# Patient Record
Sex: MALE | Race: WHITE | Employment: UNEMPLOYED | URBAN - METROPOLITAN AREA
[De-identification: names, ages, dates, MRNs, and addresses within clinical notes are randomized per-mention and may not be internally consistent; named-entity substitution may affect disease eponyms.]

---

## 2017-03-09 ENCOUNTER — OFFICE VISIT (OUTPATIENT)
Dept: SURGERY | Age: 42
End: 2017-03-09

## 2017-03-09 VITALS
HEIGHT: 72 IN | SYSTOLIC BLOOD PRESSURE: 108 MMHG | WEIGHT: 276 LBS | DIASTOLIC BLOOD PRESSURE: 60 MMHG | BODY MASS INDEX: 37.38 KG/M2 | HEART RATE: 84 BPM | RESPIRATION RATE: 17 BRPM

## 2017-03-09 DIAGNOSIS — L72.11 PILAR CYST: Primary | ICD-10-CM

## 2017-03-09 PROCEDURE — 99202 OFFICE O/P NEW SF 15 MIN: CPT | Performed by: SURGERY

## 2017-03-31 ENCOUNTER — HOSPITAL ENCOUNTER (OUTPATIENT)
Dept: OTHER | Age: 42
Discharge: HOME | End: 2017-03-31
Attending: SURGERY | Admitting: SURGERY

## 2017-03-31 ENCOUNTER — TELEPHONE (OUTPATIENT)
Dept: SURGERY | Age: 42
End: 2017-03-31

## 2017-03-31 ENCOUNTER — PROCEDURE VISIT (OUTPATIENT)
Dept: SURGERY | Age: 42
End: 2017-03-31

## 2017-03-31 VITALS
BODY MASS INDEX: 37.38 KG/M2 | SYSTOLIC BLOOD PRESSURE: 108 MMHG | RESPIRATION RATE: 16 BRPM | WEIGHT: 276 LBS | DIASTOLIC BLOOD PRESSURE: 62 MMHG | HEIGHT: 72 IN | HEART RATE: 80 BPM

## 2017-03-31 DIAGNOSIS — L72.11 PILAR CYST: Primary | ICD-10-CM

## 2017-03-31 DIAGNOSIS — L72.3 SEBACEOUS CYST: Primary | ICD-10-CM

## 2017-03-31 PROCEDURE — 11442 EXC FACE-MM B9+MARG 1.1-2 CM: CPT | Performed by: SURGERY

## 2017-04-13 ENCOUNTER — OFFICE VISIT (OUTPATIENT)
Dept: SURGERY | Age: 42
End: 2017-04-13

## 2017-04-13 VITALS
HEIGHT: 72 IN | BODY MASS INDEX: 37.38 KG/M2 | RESPIRATION RATE: 17 BRPM | SYSTOLIC BLOOD PRESSURE: 112 MMHG | DIASTOLIC BLOOD PRESSURE: 82 MMHG | WEIGHT: 276 LBS | HEART RATE: 82 BPM

## 2017-04-13 DIAGNOSIS — Z09 POSTOP CHECK: ICD-10-CM

## 2017-04-13 DIAGNOSIS — L72.3 SEBACEOUS CYST: Primary | ICD-10-CM

## 2017-04-13 PROCEDURE — 99024 POSTOP FOLLOW-UP VISIT: CPT | Performed by: SURGERY

## 2020-09-24 ENCOUNTER — HOSPITAL ENCOUNTER (OUTPATIENT)
Age: 45
Setting detail: OBSERVATION
Discharge: NURSING, REHAB OR LONG-TERM CARE | End: 2020-09-25
Attending: EMERGENCY MEDICINE | Admitting: STUDENT IN AN ORGANIZED HEALTH CARE EDUCATION/TRAINING PROGRAM
Payer: MEDICARE

## 2020-09-24 PROCEDURE — 99285 EMERGENCY DEPT VISIT HI MDM: CPT

## 2020-09-24 ASSESSMENT — PAIN DESCRIPTION - LOCATION: LOCATION: BACK

## 2020-09-24 ASSESSMENT — PAIN SCALES - GENERAL: PAINLEVEL_OUTOF10: 9

## 2020-09-25 ENCOUNTER — APPOINTMENT (OUTPATIENT)
Dept: GENERAL RADIOLOGY | Age: 45
End: 2020-09-25
Payer: MEDICARE

## 2020-09-25 VITALS
WEIGHT: 245.15 LBS | OXYGEN SATURATION: 94 % | HEART RATE: 59 BPM | RESPIRATION RATE: 16 BRPM | SYSTOLIC BLOOD PRESSURE: 109 MMHG | HEIGHT: 72 IN | TEMPERATURE: 98.1 F | DIASTOLIC BLOOD PRESSURE: 66 MMHG | BODY MASS INDEX: 33.2 KG/M2

## 2020-09-25 PROBLEM — Z71.89 ENCOUNTER FOR HOME SAFETY REVIEW FOR INJURY PREVENTION: Status: ACTIVE | Noted: 2020-09-25

## 2020-09-25 PROBLEM — I10 HTN (HYPERTENSION): Status: ACTIVE | Noted: 2020-09-25

## 2020-09-25 PROBLEM — Z86.73 HISTORY OF CVA (CEREBROVASCULAR ACCIDENT): Status: ACTIVE | Noted: 2020-09-25

## 2020-09-25 PROBLEM — R53.1 GENERALIZED WEAKNESS: Status: ACTIVE | Noted: 2020-09-25

## 2020-09-25 PROBLEM — J44.9 COPD (CHRONIC OBSTRUCTIVE PULMONARY DISEASE) (HCC): Status: ACTIVE | Noted: 2020-09-25

## 2020-09-25 PROBLEM — D72.829 LEUKOCYTOSIS: Status: ACTIVE | Noted: 2020-09-25

## 2020-09-25 PROBLEM — E78.5 HLD (HYPERLIPIDEMIA): Status: ACTIVE | Noted: 2020-09-25

## 2020-09-25 LAB
ANION GAP SERPL CALCULATED.3IONS-SCNC: 13 MMOL/L (ref 3–16)
BASOPHILS ABSOLUTE: 0.1 K/UL (ref 0–0.2)
BASOPHILS RELATIVE PERCENT: 0.7 %
BILIRUBIN URINE: NEGATIVE
BLOOD, URINE: NEGATIVE
BUN BLDV-MCNC: 11 MG/DL (ref 7–20)
CALCIUM SERPL-MCNC: 9.8 MG/DL (ref 8.3–10.6)
CHLORIDE BLD-SCNC: 105 MMOL/L (ref 99–110)
CLARITY: ABNORMAL
CO2: 23 MMOL/L (ref 21–32)
COLOR: YELLOW
CREAT SERPL-MCNC: 0.7 MG/DL (ref 0.9–1.3)
EKG ATRIAL RATE: 55 BPM
EKG DIAGNOSIS: NORMAL
EKG P AXIS: 67 DEGREES
EKG P-R INTERVAL: 152 MS
EKG Q-T INTERVAL: 434 MS
EKG QRS DURATION: 96 MS
EKG QTC CALCULATION (BAZETT): 415 MS
EKG R AXIS: 84 DEGREES
EKG T AXIS: 67 DEGREES
EKG VENTRICULAR RATE: 55 BPM
EOSINOPHILS ABSOLUTE: 0.1 K/UL (ref 0–0.6)
EOSINOPHILS RELATIVE PERCENT: 0.8 %
EPITHELIAL CELLS, UA: 1 /HPF (ref 0–5)
GFR AFRICAN AMERICAN: >60
GFR NON-AFRICAN AMERICAN: >60
GLUCOSE BLD-MCNC: 103 MG/DL (ref 70–99)
GLUCOSE URINE: NEGATIVE MG/DL
HCT VFR BLD CALC: 46.3 % (ref 40.5–52.5)
HEMOGLOBIN: 15.5 G/DL (ref 13.5–17.5)
HYALINE CASTS: 1 /LPF (ref 0–8)
INR BLD: 1.49 (ref 0.86–1.14)
KETONES, URINE: NEGATIVE MG/DL
LEUKOCYTE ESTERASE, URINE: NEGATIVE
LYMPHOCYTES ABSOLUTE: 1.5 K/UL (ref 1–5.1)
LYMPHOCYTES RELATIVE PERCENT: 9.4 %
MCH RBC QN AUTO: 26.7 PG (ref 26–34)
MCHC RBC AUTO-ENTMCNC: 33.6 G/DL (ref 31–36)
MCV RBC AUTO: 79.7 FL (ref 80–100)
MICROSCOPIC EXAMINATION: YES
MONOCYTES ABSOLUTE: 0.5 K/UL (ref 0–1.3)
MONOCYTES RELATIVE PERCENT: 3.4 %
NEUTROPHILS ABSOLUTE: 13.5 K/UL (ref 1.7–7.7)
NEUTROPHILS RELATIVE PERCENT: 85.7 %
NITRITE, URINE: NEGATIVE
PDW BLD-RTO: 16.1 % (ref 12.4–15.4)
PH UA: 7.5 (ref 5–8)
PLATELET # BLD: 282 K/UL (ref 135–450)
PMV BLD AUTO: 7.7 FL (ref 5–10.5)
POTASSIUM REFLEX MAGNESIUM: 3.7 MMOL/L (ref 3.5–5.1)
PROTEIN UA: NEGATIVE MG/DL
PROTHROMBIN TIME: 17.4 SEC (ref 10–13.2)
RBC # BLD: 5.81 M/UL (ref 4.2–5.9)
RBC UA: 1 /HPF (ref 0–4)
SODIUM BLD-SCNC: 141 MMOL/L (ref 136–145)
SPECIFIC GRAVITY UA: 1.02 (ref 1–1.03)
TOTAL CK: 142 U/L (ref 39–308)
TROPONIN: <0.01 NG/ML
URINE REFLEX TO CULTURE: ABNORMAL
URINE TYPE: ABNORMAL
UROBILINOGEN, URINE: 1 E.U./DL
WBC # BLD: 15.8 K/UL (ref 4–11)
WBC UA: 1 /HPF (ref 0–5)

## 2020-09-25 PROCEDURE — 93005 ELECTROCARDIOGRAM TRACING: CPT | Performed by: EMERGENCY MEDICINE

## 2020-09-25 PROCEDURE — 97535 SELF CARE MNGMENT TRAINING: CPT

## 2020-09-25 PROCEDURE — G0378 HOSPITAL OBSERVATION PER HR: HCPCS

## 2020-09-25 PROCEDURE — 80048 BASIC METABOLIC PNL TOTAL CA: CPT

## 2020-09-25 PROCEDURE — 97162 PT EVAL MOD COMPLEX 30 MIN: CPT

## 2020-09-25 PROCEDURE — 6370000000 HC RX 637 (ALT 250 FOR IP): Performed by: INTERNAL MEDICINE

## 2020-09-25 PROCEDURE — 97165 OT EVAL LOW COMPLEX 30 MIN: CPT

## 2020-09-25 PROCEDURE — 94760 N-INVAS EAR/PLS OXIMETRY 1: CPT

## 2020-09-25 PROCEDURE — 85025 COMPLETE CBC W/AUTO DIFF WBC: CPT

## 2020-09-25 PROCEDURE — 84484 ASSAY OF TROPONIN QUANT: CPT

## 2020-09-25 PROCEDURE — 97530 THERAPEUTIC ACTIVITIES: CPT

## 2020-09-25 PROCEDURE — 85610 PROTHROMBIN TIME: CPT

## 2020-09-25 PROCEDURE — 93010 ELECTROCARDIOGRAM REPORT: CPT | Performed by: INTERNAL MEDICINE

## 2020-09-25 PROCEDURE — 81001 URINALYSIS AUTO W/SCOPE: CPT

## 2020-09-25 PROCEDURE — 82550 ASSAY OF CK (CPK): CPT

## 2020-09-25 PROCEDURE — 94640 AIRWAY INHALATION TREATMENT: CPT

## 2020-09-25 PROCEDURE — 71045 X-RAY EXAM CHEST 1 VIEW: CPT

## 2020-09-25 PROCEDURE — 6370000000 HC RX 637 (ALT 250 FOR IP): Performed by: STUDENT IN AN ORGANIZED HEALTH CARE EDUCATION/TRAINING PROGRAM

## 2020-09-25 RX ORDER — OLOPATADINE HYDROCHLORIDE 1 MG/ML
1 SOLUTION/ DROPS OPHTHALMIC 2 TIMES DAILY
COMMUNITY

## 2020-09-25 RX ORDER — ONDANSETRON 2 MG/ML
4 INJECTION INTRAMUSCULAR; INTRAVENOUS EVERY 6 HOURS PRN
Status: DISCONTINUED | OUTPATIENT
Start: 2020-09-25 | End: 2020-09-25 | Stop reason: HOSPADM

## 2020-09-25 RX ORDER — LAMOTRIGINE 100 MG/1
100 TABLET ORAL 3 TIMES DAILY
Status: DISCONTINUED | OUTPATIENT
Start: 2020-09-25 | End: 2020-09-25 | Stop reason: HOSPADM

## 2020-09-25 RX ORDER — ACETAMINOPHEN 650 MG/1
650 SUPPOSITORY RECTAL EVERY 6 HOURS PRN
Status: DISCONTINUED | OUTPATIENT
Start: 2020-09-25 | End: 2020-09-25 | Stop reason: HOSPADM

## 2020-09-25 RX ORDER — SODIUM CHLORIDE 0.9 % (FLUSH) 0.9 %
10 SYRINGE (ML) INJECTION EVERY 12 HOURS SCHEDULED
Status: DISCONTINUED | OUTPATIENT
Start: 2020-09-25 | End: 2020-09-25 | Stop reason: HOSPADM

## 2020-09-25 RX ORDER — ATORVASTATIN CALCIUM 40 MG/1
40 TABLET, FILM COATED ORAL NIGHTLY
Status: DISCONTINUED | OUTPATIENT
Start: 2020-09-25 | End: 2020-09-25 | Stop reason: HOSPADM

## 2020-09-25 RX ORDER — SODIUM CHLORIDE 0.9 % (FLUSH) 0.9 %
10 SYRINGE (ML) INJECTION PRN
Status: DISCONTINUED | OUTPATIENT
Start: 2020-09-25 | End: 2020-09-25 | Stop reason: HOSPADM

## 2020-09-25 RX ORDER — CETIRIZINE HYDROCHLORIDE 10 MG/1
10 TABLET ORAL NIGHTLY
Status: DISCONTINUED | OUTPATIENT
Start: 2020-09-25 | End: 2020-09-25 | Stop reason: HOSPADM

## 2020-09-25 RX ORDER — KETOTIFEN FUMARATE 0.35 MG/ML
1 SOLUTION/ DROPS OPHTHALMIC 2 TIMES DAILY
Status: DISCONTINUED | OUTPATIENT
Start: 2020-09-25 | End: 2020-09-25 | Stop reason: HOSPADM

## 2020-09-25 RX ORDER — CETIRIZINE HYDROCHLORIDE 10 MG/1
10 TABLET ORAL NIGHTLY
COMMUNITY

## 2020-09-25 RX ORDER — CLONAZEPAM 0.5 MG/1
0.5 TABLET ORAL 3 TIMES DAILY PRN
Qty: 9 TABLET | Refills: 0 | Status: SHIPPED | OUTPATIENT
Start: 2020-09-25 | End: 2020-09-28

## 2020-09-25 RX ORDER — CLONAZEPAM 0.5 MG/1
0.5 TABLET ORAL 3 TIMES DAILY PRN
Status: ON HOLD | COMMUNITY
End: 2020-09-25 | Stop reason: HOSPADM

## 2020-09-25 RX ORDER — WARFARIN SODIUM 5 MG/1
10 TABLET ORAL
Status: DISCONTINUED | OUTPATIENT
Start: 2020-09-25 | End: 2020-09-25 | Stop reason: HOSPADM

## 2020-09-25 RX ORDER — VITAMIN B COMPLEX
2000 TABLET ORAL DAILY
Status: DISCONTINUED | OUTPATIENT
Start: 2020-09-25 | End: 2020-09-25 | Stop reason: HOSPADM

## 2020-09-25 RX ORDER — FLUTICASONE PROPIONATE 110 UG/1
2 AEROSOL, METERED RESPIRATORY (INHALATION) 2 TIMES DAILY
COMMUNITY

## 2020-09-25 RX ORDER — FLUTICASONE PROPIONATE 110 UG/1
2 AEROSOL, METERED RESPIRATORY (INHALATION) 2 TIMES DAILY
Status: DISCONTINUED | OUTPATIENT
Start: 2020-09-25 | End: 2020-09-25 | Stop reason: HOSPADM

## 2020-09-25 RX ORDER — ONDANSETRON 4 MG/1
4 TABLET, FILM COATED ORAL EVERY 8 HOURS PRN
COMMUNITY

## 2020-09-25 RX ORDER — BACLOFEN 10 MG/1
5 TABLET ORAL 3 TIMES DAILY
COMMUNITY

## 2020-09-25 RX ORDER — POLYETHYLENE GLYCOL 3350 17 G/17G
17 POWDER, FOR SOLUTION ORAL DAILY
Status: DISCONTINUED | OUTPATIENT
Start: 2020-09-25 | End: 2020-09-25 | Stop reason: HOSPADM

## 2020-09-25 RX ORDER — POLYETHYLENE GLYCOL 3350 17 G/17G
17 POWDER, FOR SOLUTION ORAL DAILY
COMMUNITY

## 2020-09-25 RX ORDER — LAMOTRIGINE 100 MG/1
100 TABLET ORAL 3 TIMES DAILY
COMMUNITY

## 2020-09-25 RX ORDER — CLONAZEPAM 0.5 MG/1
0.5 TABLET ORAL 3 TIMES DAILY PRN
Status: DISCONTINUED | OUTPATIENT
Start: 2020-09-25 | End: 2020-09-25 | Stop reason: HOSPADM

## 2020-09-25 RX ORDER — ACETAMINOPHEN 325 MG/1
650 TABLET ORAL EVERY 6 HOURS PRN
Status: DISCONTINUED | OUTPATIENT
Start: 2020-09-25 | End: 2020-09-25 | Stop reason: HOSPADM

## 2020-09-25 RX ORDER — ATORVASTATIN CALCIUM 40 MG/1
40 TABLET, FILM COATED ORAL NIGHTLY
COMMUNITY

## 2020-09-25 RX ORDER — WARFARIN SODIUM 6 MG/1
6 TABLET ORAL NIGHTLY
COMMUNITY

## 2020-09-25 RX ORDER — LAMOTRIGINE 150 MG/1
150 TABLET ORAL 2 TIMES DAILY
Status: ON HOLD | COMMUNITY
End: 2020-09-25

## 2020-09-25 RX ORDER — BACLOFEN 10 MG/1
5 TABLET ORAL 3 TIMES DAILY
Status: DISCONTINUED | OUTPATIENT
Start: 2020-09-25 | End: 2020-09-25 | Stop reason: HOSPADM

## 2020-09-25 RX ADMIN — LAMOTRIGINE 100 MG: 100 TABLET ORAL at 13:53

## 2020-09-25 RX ADMIN — CLONAZEPAM 0.5 MG: 0.5 TABLET ORAL at 16:51

## 2020-09-25 RX ADMIN — FLUTICASONE PROPIONATE 2 PUFF: 110 AEROSOL, METERED RESPIRATORY (INHALATION) at 12:54

## 2020-09-25 RX ADMIN — POLYETHYLENE GLYCOL 3350 17 G: 17 POWDER, FOR SOLUTION ORAL at 13:53

## 2020-09-25 RX ADMIN — Medication 2000 UNITS: at 13:53

## 2020-09-25 RX ADMIN — BACLOFEN 5 MG: 10 TABLET ORAL at 13:53

## 2020-09-25 RX ADMIN — ACETAMINOPHEN 650 MG: 325 TABLET ORAL at 10:30

## 2020-09-25 ASSESSMENT — PAIN - FUNCTIONAL ASSESSMENT
PAIN_FUNCTIONAL_ASSESSMENT: PREVENTS OR INTERFERES SOME ACTIVE ACTIVITIES AND ADLS

## 2020-09-25 ASSESSMENT — PAIN SCALES - GENERAL
PAINLEVEL_OUTOF10: 6
PAINLEVEL_OUTOF10: 9
PAINLEVEL_OUTOF10: 9
PAINLEVEL_OUTOF10: 0
PAINLEVEL_OUTOF10: 0

## 2020-09-25 ASSESSMENT — ENCOUNTER SYMPTOMS
APNEA: 0
ANAL BLEEDING: 0
ABDOMINAL PAIN: 0
EYE PAIN: 0
RECTAL PAIN: 0
COLOR CHANGE: 0
ABDOMINAL DISTENTION: 0
COUGH: 0
NAUSEA: 0
EYE REDNESS: 0
EYE DISCHARGE: 0
VOMITING: 0
WHEEZING: 0
CONSTIPATION: 0
EYE ITCHING: 0
SHORTNESS OF BREATH: 0
CHEST TIGHTNESS: 0
DIARRHEA: 0
BLOOD IN STOOL: 0
CHOKING: 0
PHOTOPHOBIA: 0
STRIDOR: 0

## 2020-09-25 ASSESSMENT — PAIN DESCRIPTION - DESCRIPTORS
DESCRIPTORS: CRAMPING;DISCOMFORT

## 2020-09-25 ASSESSMENT — PAIN DESCRIPTION - PAIN TYPE
TYPE: CHRONIC PAIN

## 2020-09-25 ASSESSMENT — PAIN DESCRIPTION - LOCATION
LOCATION: LEG

## 2020-09-25 ASSESSMENT — PAIN DESCRIPTION - FREQUENCY
FREQUENCY: CONTINUOUS

## 2020-09-25 ASSESSMENT — PAIN DESCRIPTION - PROGRESSION
CLINICAL_PROGRESSION: NOT CHANGED
CLINICAL_PROGRESSION: GRADUALLY IMPROVING
CLINICAL_PROGRESSION: NOT CHANGED

## 2020-09-25 ASSESSMENT — PAIN DESCRIPTION - ONSET
ONSET: ON-GOING

## 2020-09-25 ASSESSMENT — PAIN DESCRIPTION - ORIENTATION
ORIENTATION: LEFT

## 2020-09-25 NOTE — H&P
Hospital Medicine History & Physical      PCP: Reddy Garibay MD    Date of Admission: 9/24/2020    Date of Service: Pt seen/examined on 9/25/20 and  Placed in Observation. Chief Complaint: I wanted to smoke    History Of Present Illness: The patient is a 39 y.o. male who presents to [facility] with no place to go. Patient was just recently admitted to [facility] facility when he attempted to go smoke outside and was told he is not allowed to due to him being quarantined for 14 days because he was a new patient there. Patient then decided to leave [address] then when he got his motorized scooter down the street he decided to call 911 for assistance. Ambulance and police showed up in the place impounded his motorized scooter and the ambulance brought him to the hospital.  In the hospital his vitals and labs were checked which were all at their baseline. Patient had no acute reason to come into the hospital but needed placement. Patient normally is up near [facility] but due to his behavioral issues and leaving [address] he was sent down to [facility].  It appears as if he is worn out his welcome at multiple sites up [place].   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 No pertinent surgical history. Medications Prior to Admission:    Prior to Admission medications    Medication Sig Start Date End Date Taking? Authorizing Provider   vitamin D (CHOLECALCIFEROL) 25 MCG (1000 UT) TABS tablet Take 2,000 Units by mouth daily   Yes Historical Provider, MD   polyethylene glycol (MIRALAX) 17 g PACK packet Take 17 g by mouth daily   Yes Historical Provider, MD   fluticasone (FLOVENT HFA) 110 MCG/ACT inhaler Inhale 2 puffs into the lungs 2 times daily   Yes Historical Provider, MD   olopatadine (PATANOL) 0.1 % ophthalmic solution Place 1 drop into both eyes 2 times daily   Yes Historical Provider, MD   baclofen (LIORESAL) 10 MG tablet Take 5 mg by mouth 3 times daily   Yes Historical Provider, MD   clonazePAM (KLONOPIN) 0.5 MG tablet Take 0.5 mg by mouth 3 times daily as needed for Anxiety. Yes Historical Provider, MD   warfarin (COUMADIN) 6 MG tablet Take 6 mg by mouth nightly   Yes Historical Provider, MD   atorvastatin (LIPITOR) 40 MG tablet Take 40 mg by mouth nightly   Yes Historical Provider, MD   cetirizine (ZYRTEC) 10 MG tablet Take 10 mg by mouth nightly   Yes Historical Provider, MD   lamoTRIgine (LAMICTAL) 100 MG tablet Take 100 mg by mouth 3 times daily   Yes Historical Provider, MD   albuterol-ipratropium (COMBIVENT RESPIMAT)  MCG/ACT AERS inhaler Inhale 1 puff into the lungs every 4 hours as needed for Wheezing or Shortness of Breath   Yes Historical Provider, MD   ondansetron (ZOFRAN) 4 MG tablet Take 4 mg by mouth every 8 hours as needed for Nausea or Vomiting   Yes Historical Provider, MD       Allergies:  Benadryl [diphenhydramine];  Hydrocodone; and Ultram [tramadol hcl]    Social History:  The patient currently lives in nursing homes    TOBACCO:   reports that he has been smoking cigarettes. He has been smoking about 1.00 pack per day. He has never used smokeless tobacco.  ETOH:   reports no history of alcohol use. Family History:  Reviewed in detail and negative for DM, Early CAD, Cancer, CVA. Positive as follows:        Problem Relation Age of Onset    Mental Illness Mother        REVIEW OF SYSTEMS:   Positive for as noted in the HPI. All other systems reviewed and negative. PHYSICAL EXAM:    /64   Pulse 68   Temp 98 °F (36.7 °C) (Oral)   Resp 16   Ht 6' (1.829 m)   Wt 245 lb 2.4 oz (111.2 kg)   SpO2 90%   BMI 33.25 kg/m²     General appearance: No apparent distress appears stated age and cooperative. HEENT Normal cephalic, atraumatic without obvious deformity. Pupils equal, round, and reactive to light. Extra ocular muscles intact. Conjunctivae/corneas clear. Neck: Supple, No jugular venous distention/bruits. Trachea midline without thyromegaly or adenopathy with full range of motion. Lungs: Clear to auscultation, bilaterally without Rales/Wheezes/Rhonchi  Heart: Regular rate and rhythm with Normal S1/S2   Abdomen: Soft, non-tender or non-distended without rigidity or guarding and positive bowel sounds all four quadrants. Extremities: No clubbing, cyanosis, or edema bilaterally. Skin: Skin color, texture, turgor normal.  No rashes or lesions. Neurologic: Alert and oriented X 3, paralysis of left upper extremity secondary to prior stroke, left lower extremity appears weaker compared to the right, face is symmetrical speech is clear, unable to grasp with his left hand and paralysis mental status: Alert, oriented, thought content appropriate.   Capillary Refill: Acceptable  < 3 seconds  Peripheral Pulses: +3 Easily felt, not easily obliterated with pressure      CXR:  I have reviewed the CXR with the following interpretation: No acute process      CBC   Recent Labs     09/25/20  0047   WBC 15.8*   HGB 15.5 HCT 46.3         RENAL  Recent Labs     09/25/20  0047      K 3.7      CO2 23   BUN 11   CREATININE 0.7*     LFT'S  No results for input(s): AST, ALT, ALB, BILIDIR, BILITOT, ALKPHOS in the last 72 hours. COAG  Recent Labs     09/25/20  1019   INR 1.49*     CARDIAC ENZYMES  Recent Labs     09/25/20  0047   CKTOTAL 142   TROPONINI <0.01       U/A:    Lab Results   Component Value Date    COLORU YELLOW 09/25/2020    WBCUA 1 09/25/2020    RBCUA 1 09/25/2020    RBCUA <1 01/12/2017    MUCUS RARE 01/12/2017    BACTERIA NEGATIVE 01/12/2017    CLARITYU CLOUDY 09/25/2020    SPECGRAV 1.021 09/25/2020    LEUKOCYTESUR Negative 09/25/2020    BLOODU Negative 09/25/2020    GLUCOSEU Negative 09/25/2020       ABG  No results found for: RTW0EPD, BEART, J1TSTSGG, PHART, THGBART, STQ8DST, PO2ART, WYG2JZW      PHYSICIANS CERTIFICATION:    I certify that Judy is expected to be hospitalized for less than 2 midnights based on the following assessment and plan:      ASSESSMENT/PLAN:    Residual left-sided paralysis  Continue home medications    Hypercholesterolemia  Continue home medications    Chronic allergic rhinitis  Continue home medications    Vitamin D deficiency  Continue home medications    History of CVA  Continue statin and warfarin      DVT Prophylaxis: lovenox  Diet: DIET GENERAL;  Code Status: Full Code  PT/OT Eval Status: NA    Dispo - Obs    Patient is only placed in the hospital for placement issues. Patient has no medical reason to stay in the hospital.  He is stable for discharge once placement is found       Stephany Lopes MD    Thank you Kimberly Colin MD for the opportunity to be involved in this patient's care. If you have any questions or concerns please feel free to contact me at [phone].

## 2020-09-25 NOTE — ED PROVIDER NOTES
[address] [facility] [place]      Pt Name: Ramirez Edgar  MRN: [id number]  Robyngfmarivel 1975  Date of evaluation: 9/24/2020  Provider: Steff Ogden MD    CHIEF COMPLAINT       Chief Complaint   Patient presents with    Other       Joseline Mask is a 39 y.o. male who presents to the emergency department with social issue. Patient has left sided deficit from stroke in past. Lives at [facility] in wheelchair usually. Got in argument with staff tonight and signed out AMA. Called EMS from side of road because he had no place to go.  towed patients wheelchair. Presents to ER for evaluation. Nursing Notes were reviewed. Including nursing noted for FM, Surgical History, Past Medical History, Social History, vitals, and allergies; agree with all. REVIEW OF SYSTEMS       Review of Systems   Constitutional: Negative for activity change, appetite change, chills, diaphoresis, fatigue, fever and unexpected weight change. HENT: Negative for congestion, dental problem, drooling, ear discharge and ear pain. Eyes: Negative for photophobia, pain, discharge, redness, itching and visual disturbance. Respiratory: Negative for apnea, cough, choking, chest tightness, shortness of breath, wheezing and stridor. Cardiovascular: Negative for palpitations and leg swelling. Gastrointestinal: Negative for abdominal distention, abdominal pain, anal bleeding, blood in stool, constipation, diarrhea, nausea, rectal pain and vomiting. Endocrine: Negative for cold intolerance and heat intolerance. Genitourinary: Negative for decreased urine volume and urgency. Musculoskeletal: Negative for arthralgias and joint swelling. Skin: Negative for color change and pallor. Neurological: Negative for dizziness and facial asymmetry. Hematological: Negative for adenopathy. Does not bruise/bleed easily.    Psychiatric/Behavioral: Negative for agitation, behavioral problems, confusion and decreased concentration. Except as noted above the remainder of the review of systems was reviewed and negative. 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 No pertinent surgical history.     CURRENT MEDICATIONS       Previous Medications    ACETAMINOPHEN 325 MG CAPS    Take by mouth as needed Take 2 tablets (650 mg) by mouth every 4 hours as needed for pain    ALBUTEROL SULFATE  (90 BASE) MCG/ACT INHALER    Inhale 2 puffs into the lungs every 4 hours as needed for Wheezing    ASCORBIC ACID (VITAMIN C) 500 MG CAPS    Take by mouth 2 times daily    ATORVASTATIN (LIPITOR) 20 MG TABLET    Take 20 mg by mouth nightly Take 1 tablet by mouth daily at bedtime-hyperlipidemia    AZELASTINE HCL NA    by Nasal route 4 times daily Sinus pressure    BISACODYL (DULCOLAX) 5 MG EC TABLET    Take 5 mg by mouth daily as needed for Constipation Take 2 tablets (10 mg) as needed    CETIRIZINE (ZYRTEC) 10 MG TABLET    Take 10 mg by mouth daily Take 2 tablets (20 mg) by mouth daily at bedtime-allergies    CHOLECALCIFEROL (VITAMIN D3 PO)    Take 1,000 Units by mouth Take 2 tablets by mouth daily    CLONAZEPAM (KLONOPIN) 0.5 MG TABLET    Take 0.5 mg by mouth 3 times daily anxiety    HYDROCHLOROTHIAZIDE (HYDRODIURIL) 12.5 MG TABLET    Take 12.5 mg by mouth daily    KETOCONAZOLE (NIZORAL) 2 % SHAMPOO    Apply topically daily as needed for Itching Apply topically daily as needed. MAGNESIUM HYDROXIDE (MILK OF MAGNESIA) 400 MG/5ML SUSPENSION    Take by mouth daily as needed for Constipation    MELATONIN 3 MG TABS TABLET    Take 3 mg by mouth daily At bedtime take 2 tablets    MIRTAZAPINE (REMERON) 30 MG TABLET    Take 30 mg by mouth nightly    OXYCODONE-ACETAMINOPHEN (PERCOCET) 7.5-325 MG PER TABLET    Take 1 tablet by mouth 4 times daily . POLYETHYLENE GLYCOL 3350 (GAVILAX PO)    Take by mouth    POLYVINYL ALCOHOL (ARTIFICIAL TEARS) 1.4 % OPHTHALMIC SOLUTION    1 drop as needed    PRAZOSIN (MINIPRESS) 2 MG CAPSULE    Take 2 mg by mouth nightly    SENNA-DOCUSATE (PERICOLACE) 8.6-50 MG PER TABLET    Take 1 tablet by mouth daily Take 2 tablets by mouth twice a day for stool challenged    TAMSULOSIN (FLOMAX) 0.4 MG CAPSULE    Take 0.4 mg by mouth nightly    TIZANIDINE (ZANAFLEX) 4 MG TABLET    Take 4 mg by mouth 2 times daily Take 1 tablet by mouth twice daily at 6 am and 6 pm  Take 2 tablets (8 mg) by mouth at bedtime for spasms at 9 pm    TRAMADOL (ULTRAM) 50 MG TABLET    Take 50 mg by mouth every 4 hours as needed for Pain Breakthrough pain    WARFARIN (COUMADIN) 6 MG TABLET    Take 6 mg by mouth daily    ZIPRASIDONE (GEODON) 60 MG CAPSULE    Take 60 mg by mouth daily       ALLERGIES     Benadryl [diphenhydramine]; Hydrocodone; and Ultram [tramadol hcl]    FAMILY HISTORY      History reviewed. No pertinent family history.     SOCIAL HISTORY       Social History     Socioeconomic History    Marital status: Single     Spouse name: None    Number of children: None    Years of education: None    Highest education level: None   Occupational History    None   Social Needs    Financial resource strain: None    Food insecurity     Worry: None     Inability: None    Transportation needs     Medical: None     Non-medical: None   Tobacco Musculoskeletal: Normal range of motion. General: No tenderness or deformity. Skin:     General: Skin is warm. Coloration: Skin is not pale. Findings: No erythema or rash. Neurological:      Mental Status: He is alert. Mental status is at baseline. Motor: No abnormal muscle tone.    Psychiatric:         Mood and Affect: Mood normal.         Behavior: Behavior normal.         DIAGNOSTIC RESULTS     EKG: All EKG's are interpreted by the Emergency Department Physician who either signs or Co-signs this chart in the absence of acardiologist.    EKG shows NSR nonspecific ekg changes no ectopy     RADIOLOGY:   Non-plain film images such as CT, Ultrasoundand MRI are read by the radiologist. Plain radiographic images are visualized and preliminarily interpreted by the emergency physician with the below findings:    CXR reassuring     ED BEDSIDE ULTRASOUND:   Performed by ED Physician - none    LABS:  Labs Reviewed   CBC WITH AUTO DIFFERENTIAL - Abnormal; Notable for the following components:       Result Value    WBC 15.8 (*)     MCV 79.7 (*)     RDW 16.1 (*)     Neutrophils Absolute 13.5 (*)     All other components within normal limits    Narrative:     Performed at:  [address] [facility] [address]   Phone [phone]   BASIC METABOLIC PANEL W/ REFLEX TO MG FOR LOW K - Abnormal; Notable for the following components:    Glucose 103 (*)     CREATININE 0.7 (*)     All other components within normal limits    Narrative:     Performed at:  [address] [facility] [address]   Phone [phone]   URINE RT REFLEX TO CULTURE - Abnormal; Notable for the following components:    Clarity, UA CLOUDY (*)     All other components within normal limits    Narrative:     Performed at:  [address] [facility] [address]   Phone [phone] TROPONIN    Narrative:     Performed at:  [facility]  [address] [place] [address]   Phone [phone]   CK    Narrative:     Performed at:  [facility]  [address] [place] [address]   Phone [phone]   MICROSCOPIC URINALYSIS    Narrative:     Performed at:  [facility]  [address]  [place] [address]   Phone [phone]       All other labs were withinnormal range or not returned as of this dictation. EMERGENCY DEPARTMENT COURSE and DIFFERENTIAL DIAGNOSIS/MDM:     PMH, Surgical Hx, FH, Social Hx reviewed by myself (ETOH usage, Tobacco usage, Drug usage reviewed by myself, no pertinent Hx)- No Pertinent Hx     Old records were reviewed by me    39 yr old with flaccid paralysis left side from old stroke from [address] [employer] because he wanted to smoke tonight and sat on the side of road in wheelchair. Called 911 when he had no place to go. EMS picked him up and brought him here. Wheelchair was towed by police. As he cannot walk and has no wheelchair observtaion for placement. CRITICAL CARE TIME   Total Critical Caretime was 21 minutes, excluding separately reportable procedures. There was a high probability of clinically significant/life threatening deterioration in the patient's condition which required my urgent intervention. PROCEDURES:  Unlessotherwise noted below, none    FINAL IMPRESSION      1.  Cannot walk          DISPOSITION/PLAN   DISPOSITION Decision To Admit 09/24/2020 11:21:16 PM    (Please note that portions ofthis note were completed with a voice recognition program.  Efforts were made to edit the dictations but occasionally words are mis-transcribed.)    Haider Torre MD(electronically signed)  Attending Emergency Physician          Haider Torre MD  09/25/20 2034

## 2020-09-25 NOTE — CARE COORDINATION
9/25 Patient came to ED on 9/24 via ambulance from [place] at [facility]. He was only there for one day and signed out AMA because they wouldn't allow him to smoke. He is under COVID quarantine for 14 days because he is a new patient. He states that he got in an argument with the staff and signed out AMA. He called 911 and they came to get him but they couldn't take his motorized wheelchair so they called the Police who told him that they would tow his w/c and impound it. Patient has extensive history of multiple facilities in the [facility]. CM will place call to [facility] regarding possible return to their facility. Electronically signed by Mary Beth Huff RN on 9/25/2020 at 11:11 AM     9/25 Call received from Tavares Husain at [facility]. She will talk to her DON and see if they can take the patient back. Await call. Electronically signed by Mary Beth Huff RN on 9/25/2020 at 11:40 AM    9/25 Call received from [facility]. They well not take patient back. Electronically signed by Mary Beth Huff RN on 9/25/2020 at 1:09 PM

## 2020-09-25 NOTE — PROGRESS NOTES
This writer called report to [facility]. Report given to [facility].  Electronically signed by Kelly Skaggs RN on 9/25/2020 at 5:54 PM

## 2020-09-25 NOTE — PROGRESS NOTES
Occupational Therapy   Occupational Therapy Initial Assessment  Date: 2020   Patient Name: Ernie Jackson  MRN: [id number]     : 1975    Date of Service: 2020    Discharge Recommendations:  Patient would benefit from continued therapy after discharge, 3-5 sessions per week  OT Equipment Recommendations  Other: defer to [address] scored a 15/24 on the AM-PAC ADL Inpatient form. Current research shows that an AM-PAC score of 17 or less is typically not associated with a discharge to the patient's home setting. Based on the patient's AM-PAC score and their current ADL deficits, it is recommended that the patient have 3-5 sessions per week of Occupational Therapy at d/c to increase the patient's independence. Please see assessment section for further patient specific details. If patient discharges prior to next session this note will serve as a discharge summary. Please see below for the latest assessment towards goals. Assessment   Performance deficits / Impairments: Decreased functional mobility ; Decreased safe awareness;Decreased balance;Decreased ADL status; Decreased endurance  Assessment: 38 y/o male admitted  after leaving AMA at [facility] and had no place to go. Pt with history of CVA () and primarily uses motorized scooter to get around and requires assist for ADLs. Today, pt was able to stand to maría banegasdy with min A. Pt c/o multiple muscle spasms and pain. Pt with no AROM on LUE and pt wears sling during transfers becaus \"it gets in the way. \" Pt is hopeful he can return to independent life style. Pt will benefit from skilled therapy at this time. Prognosis: Fair  Decision Making: Low Complexity  OT Education: OT Role;Plan of Care  REQUIRES OT FOLLOW UP: Yes  Activity Tolerance  Activity Tolerance: c/o muscle spasms  Safety Devices  Safety Devices in place: Yes  Type of devices: Nurse notified;Gait belt;Call light within reach; Left in bed;Bed alarm in place Patient Diagnosis(es): The encounter diagnosis was Cannot walk.     has a past medical history of Anxiety, Arthritis, Asthma, Bipolar disorder ([facility] [address] BPH (benign prostatic hypertrophy), Cerebral artery occlusion with cerebral infarction [facility]), Cerebral infarction due to embolism of right middle cerebral artery ([facility] [address] Constipation, COPD (chronic obstructive pulmonary disease) ([facility] [address] Depression, Difficulty walking, Heart failure ([facility] [address] Hemiplegia and hemiparesis, Hx of blood clots, Hyperlipidemia, Hypertension, Muscle weakness, PTSD (post-traumatic stress disorder), PTSD (post-traumatic stress disorder), Unspecified lack of coordination, and Vitamin D deficiency. has no past surgical history on file. Restrictions  Restrictions/Precautions  Restrictions/Precautions: Fall Risk  Position Activity Restriction  Other position/activity restrictions: brace L ankle and shoes on for transfers. Pt wears sling on left arm for transfers because \"it gets in the way\"    Subjective   General  Chart Reviewed: Yes  Patient assessed for rehabilitation services?: Yes  Additional Pertinent Hx: Per ED notes: 39 y.o. male who presents to the emergency department with social issue. Patient has left sided deficit from stroke in past (2013). Lives at [facility] in wheelchair usually. Got in argument with staff tonight and signed out AMA. Called EMS from side of road because he had no place to go.  towed patients wheelchair. Presents to ER for evaluation and placement. Family / Caregiver Present: No  Referring Practitioner: Ozzie Jefferson MD  Subjective  Subjective: Pt seen bedside and agreeable to therapy. General Comment  Comments: Per RN ok for therapy.     Social/Functional History  Social/Functional History  Type of Home: Facility(was at [facility] x 1 day and left AMA)  Bathroom Equipment: Tub transfer bench  Home Equipment: Wheelchair-electric, Hospital bed, Wheelchair-manual(richard-walker)  ADL Assistance: Needs assistance(the pt reports he is able to wash down to knees and gets assist for lower legs and sometimes brionna-area; gets asisst for dressing; uses a urinal mostly, gets assist for getting onto commode for BM)  Ambulation Assistance: Needs assistance(uses a motorized w/c and this is his main mobility at this time; was able to walk after CVA with a KAFO)  Transfer Assistance: Needs assistance(2 person assist for a stand pivot)  Active : No  Additional Comments: denies recent falls       Objective   Vision: Impaired  Vision Exceptions: Wears glasses at all times  Hearing: Within functional limits          Balance  Sitting Balance: Contact guard assistance(EOB 7-10 min to reid shoes and stand to stedy)  Standing Balance  Time: stood prn with maría stedy support to move patient up in bed  Functional Mobility  Functional Mobility Comments: use of maría stedy to move pt up in bed  ADL  LE Dressing: (assist to reid L ankle brace and shoes prior to standing)  Additional Comments: Anticipate pt will require max A for toileting, mod A for UB bathing/dressing, and min A for grooming based on balance and ROM observed        Bed mobility  Rolling to Left: Independent  Rolling to Right: Independent  Supine to Sit: Moderate assistance  Sit to Supine: Contact guard assistance;Minimal assistance  Scooting: Minimal assistance  Transfers  Sit to stand: Minimal assistance  Stand to sit: Minimal assistance  Transfer Comments: to/from maría dania. Cognition  Overall Cognitive Status: Exceptions  Following Commands:  Follows one step commands consistently  Attention Span: Attends with cues to redirect  Memory: Decreased short term memory  Safety Judgement: Decreased awareness of need for assistance;Decreased awareness of need for safety  Initiation: Requires cues for some                 LUE AROM (degrees)  LUE General AROM: no AROM L UE- pt can passively flex elbow to fit into sling, does not mobilize shoulder 2/2 pain d/t sublexed  Left Hand AROM (degrees)  Left Hand General AROM: no AROM- hand presented in fisted position but therapist able to open digits. Pt reports he does stretch hand throughout day  RUE AROM (degrees)  RUE AROM : WFL  Right Hand AROM (degrees)  Right Hand AROM: WFL        Plan   Plan  Times per week: 3-5  Current Treatment Recommendations: Strengthening, Endurance Training, Neuromuscular Re-education, Balance Training, Functional Mobility Training, Self-Care / ADL, ROM    AM-PAC Score  AM-[facility] Inpatient Daily Activity Raw Score: 15 (09/25/20 1458)  AM-PAC Inpatient ADL T-Scale Score : 34.69 (09/25/20 1458)  ADL Inpatient CMS 0-100% Score: 56.46 (09/25/20 1458)  ADL Inpatient CMS G-Code Modifier : CK (09/25/20 1458)    Goals  Short term goals  Time Frame for Short term goals: Prior to DC: Short term goal 1: Pt will complete ADL transfers via pivot with min A x 2  Short term goal 2: Pt will tolerate standing > 3 min for standing components of ADLs with min A  Short term goal 3: Pt will complete bed mobility with SBA  Long term goals  Time Frame for Long term goals : stgs=ltgs  Patient Goals   Patient goals : \"to get therapy and return to independent life style\"       Therapy Time   Individual Concurrent Group Co-treatment   Time In 1420         Time Out 1458         Minutes 38         Timed Code Treatment Minutes: 23 Minutes     This note to serve as OT d/c summary if pt is d/c-ed prior to next therapy session.     Óscar Menendez, OTR/L

## 2020-09-25 NOTE — PROGRESS NOTES
Patient is alert and oriented x4, up with x1 assist, call light within reach, bed/chair alarm on. Patient refused morning Lovenox. VSS and WDL. No s/s of distress, no further needs noted at this time.  Electronically signed by Matthew Nino RN on 9/25/2020 at 9:44 AM

## 2020-09-25 NOTE — ED TRIAGE NOTES
Patient left nursing home around 2100 AMA because they would not let him leave to smoke a cig and did not have his bipolar medication. Reports he made it down a hill and didn't know what to do so he called 911. Patient does not have family in [address] and no plan. Reports the nursing home said they would not take him back.

## 2020-09-25 NOTE — DISCHARGE INSTR - COC
Continuity of Care Form    Patient Name: Johnny Buckley   :  1975  MRN:  [id number]    Admit date:  2020  Discharge date:  ***    Code Status Order: Full Code   Advance Directives:   Advance Care Flowsheet Documentation     Date/Time Healthcare Directive Type of Healthcare Directive Copy in [address] Agent's Name Healthcare Agent's Phone Number    20 1518  Yes, patient has an advance directive for healthcare treatment  Durable power of  for health care  No, copy requested from family  Healthcare power of   Cindy Shannon  [phone]    20 8882  Yes, patient has an advance directive for healthcare treatment  Durable power of  for health care  No, copy requested from family  Healthcare power of   --  --          Admitting Physician:  Casey Gutiérrez DO  PCP: Anayeli Fry MD    Discharging Nurse: [facility] Unit/Room#: D5S-4873/0231-09  Discharging Unit Phone Number: ***    Emergency Contact:   Extended Emergency Contact Information  Primary Emergency Contact: [address] Phone: [phone]  Work Phone: [phone]  Mobile Phone: [phone]  Relation: Parent    Past Surgical History:  History reviewed. No pertinent surgical history. Immunization History: There is no immunization history on file for this patient.     Active Problems:  Patient Active Problem List   Diagnosis Code    Generalized weakness R53.1    History of CVA (cerebrovascular accident) Z80.78    HTN (hypertension) I10    HLD (hyperlipidemia) E78.5    COPD (chronic obstructive pulmonary disease) (HCC) J44.9    Leukocytosis D72.829    Encounter for home safety review for injury prevention Z71.89       Isolation/Infection:   Isolation          No Isolation        Patient Infection Status     None to display          Nurse Assessment:  Last Vital Signs: /66   Pulse 59   Temp 98.1 °F (36.7 °C) (Oral)   Resp 16   Ht 6' (1.829 m)   Wt 245 lb 2.4 oz (111.2 kg)   SpO2 94%   BMI 33.25 kg/m²     Last documented pain score (0-10 scale): Pain Level: 6  Last Weight:   Wt Readings from Last 1 Encounters:   20 245 lb 2.4 oz (111.2 kg)     Mental Status:  {IP PT MENTAL STATUS:}    IV Access:  { SONAM IV ACCESS:118179173}    Nursing Mobility/ADLs:  Walking   {CHP DME PCXL:558415810}  Transfer  {CHP DME AITH:250647553}  Bathing  {CHP DME VQXU:557554654}  Dressing  {CHP DME AOBM:726102234}  Toileting  {CHP DME VYK}  Feeding  {CHP DME EYIV:811366055}  Med Admin  {CHP DME HWYW:445768859}  Med Delivery   { SONAM MED Delivery:181884719}    Wound Care Documentation and Therapy:        Elimination:  Continence:   · Bowel: {YES / AD:84276}  · Bladder: {YES / ET:91273}  Urinary Catheter: {Urinary Catheter:184272313}   Colostomy/Ileostomy/Ileal Conduit: {YES / BC:}       Date of Last BM: ***  No intake or output data in the 24 hours ending 20 1634  No intake/output data recorded.     Safety Concerns:     [address] Safety Concerns:930795091}    Impairments/Disabilities:      [address] Impairments/Disabilities:980274554}    Nutrition Therapy:  Current Nutrition Therapy:   [address] Diet List:309473779}    Routes of Feeding: {CHP DME Other Feedings:008536959}  Liquids: {Slp liquid thickness:28243}  Daily Fluid Restriction: {CHP DME Yes amt example:540147595}  Last Modified Barium Swallow with Video (Video Swallowing Test): {Done Not Done ZNOX:965782046}    Treatments at the Time of Hospital Discharge:   Respiratory Treatments: ***  Oxygen Therapy:  {Therapy; copd oxygen:80630}  Ventilator:    {[facility] Vent SICT:574915249}    Rehab Therapies: {THERAPEUTIC INTERVENTION:6887024497}  Weight Bearing Status/Restrictions: [address]  Weight Bearin}  Other Medical Equipment (for information only, NOT a DME order):  {EQUIPMENT:271868234}  Other Treatments: ***    Patient's personal belongings (please select all that are sent with patient):  {CHP DME Belongings:180088605}    RN SIGNATURE:  {Esignature:801958298}    CASE MANAGEMENT/SOCIAL WORK SECTION    Inpatient Status Date: N/A    Readmission Risk Assessment Score:  Readmission Risk              Risk of Unplanned Readmission:        0           Discharging to Facility/ Agency   · [facility]  [facility]  · TYJ:[phone]    Dialysis Facility (if applicable)   · Name:  · Address:  · Dialysis Schedule:  · Phone:  · Fax:    / signature: Electronically signed by CLOTILDE Cash on 20 at 4:35 PM EDT    PHYSICIAN SECTION    Prognosis: {Prognosis:8725754633}    Condition at Discharge: [address] [place] [address] Patient Condition:683641715}    Rehab Potential (if transferring to Rehab): {Prognosis:3494207472}    Recommended Labs or Other Treatments After Discharge: ***    Physician Certification: I certify the above information and transfer of Judy  is necessary for the continuing treatment of the diagnosis listed and that he requires {Admit to Appropriate Level of Care:87874} for {GREATER/LESS:038470824} 30 days.      Update Admission H&P: {CHP DME Changes in ZKJVN:826165531}    PHYSICIAN SIGNATURE:  {Esignature:706958796}

## 2020-09-25 NOTE — PROGRESS NOTES
This writer attempted to maribel report to [facility]. Spoke with Yuri Navarrete who transferred this writer to [facility] and no one answered. Will attempt to maribel back.  Electronically signed by Deny Aldridge RN on 9/25/2020 at 5:13 PM

## 2020-09-25 NOTE — ED NOTES
[employer] states that [place] officers towed pt wheelchair. Called [place] at [phone] and spoke with an officer there that states they towed the wheelchair to the impound lot and when pt is discharged they can call [phone] to get it.        Eduardo Rogers RN  09/24/20 8191

## 2020-09-25 NOTE — PROGRESS NOTES
Physical Therapy    Facility/Department: [address] MED SURG  Initial Assessment  If patient discharges prior to next session this note will serve as a discharge summary. Please see below for the latest assessment towards goals. NAME: Roman Moya  : 1975  MRN: [id number]    Date of Service: 2020    Discharge Recommendations:  Patient would benefit from continued therapy after discharge, 3-5 sessions per week   Roman Moya scored a 14/24 on the AM-PAC short mobility form. Current research shows that an AM-PAC score of 17 or less is typically not associated with a discharge to the patient's home setting. Based on the patient's AM-PAC score and their current functional mobility deficits, it is recommended that the patient have 3-5 sessions per week of Physical Therapy at d/c to increase the patient's independence. Please see assessment section for further patient specific details. PT Equipment Recommendations  Equipment Needed: No    Assessment   Assessment: The pt is a 38 yo male who was brought to the ED from his facility because the pt called 911 after getting into argument with his facility re: not being allowed to smoke. Apparently the pt has been in many facilities mostly in [facility] but was sent to [facility] due to his behavioral issues. [facility] will not take the pt back and the pt is being held here for placement purposes only. The pt reports he has been non-ambulatory since his KAFO was broken but he could not remember how long it has been, he reports he gets assist for self-care and for pivot transfers to the w/c; he is indep in using his motorized w/c. PMHx:anxiety, arth, asthma, bipolar, CVA, COPD, depression, heart failure, blood clots, HTN, PTSD      Today, the pt was cooperative but did rate pain in L arm and leg at a 8-9/10.  He completed bed mobility with mod A to get out and CGA to get back in, was able to sit EOB with close SBA and was able to come to standing in the stedy with min A. The pt is functioning below his baseline of being able to complete a pivot transfer and would benefit from con't moderate intensity skilled PT for strengthening and progressive mobility to asisst in his goal to walk again and be more independent. Will con't to follow. Specific instructions for Next Treatment: cotx  Prognosis: Fair;Guarded  Decision Making: Medium Complexity  History: see above  Exam: see above  Clinical Presentation: evolving  PT Education: PT Role;Orientation;General Safety  Barriers to Learning: cog, memory  REQUIRES PT FOLLOW UP: Yes  Activity Tolerance  Activity Tolerance: Patient limited by pain       Patient Diagnosis(es): The encounter diagnosis was Cannot walk.     has a past medical history of Anxiety, Arthritis, Asthma, Bipolar disorder ([facility] [address] BPH (benign prostatic hypertrophy), Cerebral artery occlusion with cerebral infarction [facility]), Cerebral infarction due to embolism of right middle cerebral artery ([facility] [address] Constipation, COPD (chronic obstructive pulmonary disease) ([facility] [address] Depression, Difficulty walking, Heart failure ([facility] [address] Hemiplegia and hemiparesis, Hx of blood clots, Hyperlipidemia, Hypertension, Muscle weakness, PTSD (post-traumatic stress disorder), PTSD (post-traumatic stress disorder), Unspecified lack of coordination, and Vitamin D deficiency. has no past surgical history on file. Restrictions  Restrictions/Precautions  Restrictions/Precautions: Fall Risk  Position Activity Restriction  Other position/activity restrictions: brace L ankle and shoes on for transfers.  Pt wears sling on left arm for transfers because \"it gets in the way\"  Vision/Hearing  Vision: Impaired  Vision Exceptions: Wears glasses at all times(blind in L eye)  Hearing: Within functional limits     Subjective  General  Chart Reviewed: Yes  Patient assessed for rehabilitation services?: Yes  Additional Pertinent Hx: Per H&P on 9- of Deja Spears MD: The pt is a 38 yo male who was brought to the ED from his facility because the pt called 911 after getting into argument with his facility re: not being allowed to smoke. Apparently the pt has been in many facilities mostly in [facility] but was sent to [facility] due to his behavioral issues. [facility] will not take the pt back and the pt is being held here for placement purposes only. PMHx:anxiety, arth, asthma, bipolar, CVA, COPD, depression, heart failure, blood clots, HTN, PTSD  Response To Previous Treatment: Not applicable  Family / Caregiver Present: No  Referring Practitioner: Ayaan Ledezma MD  Referral Date : 09/25/20  Diagnosis: none (here for placement purposes)  Follows Commands: Within Functional Limits  Subjective  Subjective: 8-9/10 pain in his L arm and L leg, mm spasms; the pt agreeable for the most part but does get agitated when in pain  Pain Screening  Patient Currently in Pain: Denies          Orientation  Orientation  Overall Orientation Status: Within Functional Limits  Orientation Level: Oriented to person;Oriented to time;Oriented to place  Social/Functional History  Social/Functional History  Type of Home: Facility(was at [facility] x 1 day and left AMA)  Bathroom Equipment: Tub transfer bench  Home Equipment: [address], Wheelchair-manual(richard-walker)  ADL Assistance: Needs assistance(the pt reports he is able to wash down to knees and gets assist for lower legs and sometimes brionna-area; gets asisst for dressing; uses a urinal mostly, gets assist for getting onto commode for BM)  Ambulation Assistance: Needs assistance(uses a motorized w/c and this is his main mobility at this time; was able to walk after CVA with a KAFO)  Transfer Assistance: Needs assistance(2 person assist for a stand pivot)  Active : No  Additional Comments: denies recent falls  Cognition   Cognition  Overall Cognitive Status: Exceptions  Following Commands:  Follows one step commands consistently  Attention Span: Attends with cues to redirect  Memory: Decreased short term memory  Safety Judgement: Decreased awareness of need for assistance;Decreased awareness of need for safety  Initiation: Requires cues for some    Objective  AROM RLE (degrees)  RLE AROM: WFL  PROM LLE (degrees)  LLE General PROM: limited knee extension, ankle turned into inversion  Strength RLE  Comment: functionally fair  Strength LLE  Comment: no active movement of the ankle, knee has some flexion and hip has a little flexion  Tone RLE  RLE Tone: Normotonic  Tone LLE  LLE Tone: Hypertonic     Bed mobility  Rolling to Left: Independent  Rolling to Right: Independent  Supine to Sit: Moderate assistance  Sit to Supine: Contact guard assistance;Minimal assistance  Scooting: Minimal assistance  Transfers  Sit to Stand: Minimal Assistance  Stand to sit: Minimal Assistance  Bed to Chair: Dependent/Total(with stedy)  Ambulation  Ambulation?: No(non-ambulatory at baseline; uses a motorized w/c)     Balance  Sitting - Static: Good  Sitting - Dynamic: Fair;+(falls posteriorly with challenges)  Standing - Static: Good;-(in the stedy)  Comments: the pt able to sit EOB with close SBA and occasional CGA and stood in the stedy with CGA/min A        Plan   Plan  Times per week: 3-5x/week  Specific instructions for Next Treatment: cotx  Current Treatment Recommendations: Functional Mobility Training, ROM, Strengthening  Safety Devices  Type of devices: Call light within reach, Bed alarm in place, Gait belt, Patient at risk for falls, Left in bed, Nurse notified(Amanda RN notified)      AM-PAC Score  AM-PAC Inpatient Mobility Raw Score : 14 (09/25/20 1453)  AM-PAC Inpatient T-Scale Score : 38.1 (09/25/20 1453)  Mobility Inpatient CMS 0-100% Score: 61.29 (09/25/20 1453)  Mobility Inpatient CMS G-Code Modifier : CL (09/25/20 1453)          Goals  Short term goals  Time Frame for Short term goals: upon d/c  Short term goal 1: Supine > sit with CGA/min A. Short term goal 2: Seated EOB with SBA/Supervision. Short term goal 3: Pivot transfer bed <> w/c with mod A of 1-2.   Patient Goals   Patient goals : to be able to walk again so he can live more indep'ly       Therapy Time   Individual Concurrent Group Co-treatment   Time In 1410         Time Out 1500         Minutes 50         Timed Code Treatment Minutes: 35 Minutes     Electronically signed by Lucy Aguirre, PT [id number] on 9/25/2020 at 3:05 PM

## 2020-09-25 NOTE — PROGRESS NOTES
Data- discharge order received, pt verbalized agreement to discharge, disposition is [place]. Action- discharge instructions prepared and given to transport, Medication information packet given r/t NEW and/or CHANGED prescriptions emphasizing name/purpose/side effects, pt verbalized understanding. Discharge instruction summary: Diet- general, Activity- as tolerated, Primary Care Physician as followsFred Marin -[phone] , CHF Education reviewed. Pt/ Family has had a total of 60 minutes CHF education this admission encounter. Response- Pt belongings gathered. Disposition is [place], transported with [facility] transport, taken to lobby via stretcher, no complications.  Electronically signed by Jaelyn Rose RN on 9/25/2020 at 5:38 PM

## 2020-09-25 NOTE — CARE COORDINATION
Discharge Plan:  Patient discharge to [address] in [facility]   RN-to call report to [phone]  Medical transport with [address],  time 5:30pm  HENS not needed.

## 2020-09-25 NOTE — PROGRESS NOTES
Clinical Pharmacy Note  Warfarin Consult    Hemant Jaimes is a 39 y.o. male receiving warfarin managed by pharmacy. Patient being bridged with Lovenox 40 mg SQ Q24H. Warfarin Indication: Hx CVA  Target INR range: 2-3  Dose prior to admission: 6 mg po QHS    Current warfarin drug-drug interactions: none of clinical significance    Recent Labs     09/25/20  0047 09/25/20  1019   HGB 15.5  --    HCT 46.3  --    INR  --  1.49*       Assessment/Plan:    Patient missed his Warfarin dose on 09/24/20. Will order Warfarin 10 mg tonight. Daily PT/INR until stable within therapeutic range. Thank you for the consult. Will continue to follow.     Liz ConroyD, BCPS  9/25/2020 12:37 PM

## 2020-09-25 NOTE — CARE COORDINATION
Impounded Wheelchair:  SW spoke with patient and advised I would assist him in getting his wheelchair back, provided my card and he will call me Monday. Patient asked SW to call his mother and advise her of the address and phone # of SNF. SW to call pt's mother. Spoke with Mother - Marlin Nolen- she wants to help- her address is [address] [id number]  Her home phone # is [phone]. Called Impound lot regarding patient's wheelchair [phone]. Spoke with Lluvia Quinones- wheel chair is at [facility] to  chair is : Today- $190.00  Tomorrow- $ 216.75  Sunday- $ 268.50  Monday-$ 295.25    Patient will need to come with ID or notorizied letter giving person picking up chair permission. Advised Alix with [facility] admissions of Impoundment information and called patient's mother and gave information, she is not able to pay the impoundment cost.  Pt's mother will call Alix rwnuhfgy- [phone].

## 2020-09-26 NOTE — DISCHARGE SUMMARY
Hospital Medicine Discharge Summary    Patient ID: Ken Koroma      Patient's PCP: Valerie Daniel MD    Admit Date: 9/24/2020     Discharge Date: 9/25/2020      Admitting Physician: Annie Tang DO     Discharge Physician: Tommy Carrillo MD     Discharge Diagnoses: Active Hospital Problems    Diagnosis Date Noted    Generalized weakness [R53.1] 09/25/2020    History of CVA (cerebrovascular accident) [Z86.73] 09/25/2020    HTN (hypertension) [I10] 09/25/2020    HLD (hyperlipidemia) [E78.5] 09/25/2020    COPD (chronic obstructive pulmonary disease) ([facility] [address] [J44.9] 09/25/2020    Leukocytosis [D72.829] 09/25/2020    Encounter for home safety review for injury prevention [Z71.89] 09/25/2020       The patient was seen and examined on day of discharge and this discharge summary is in conjunction with any daily progress note from day of discharge. Hospital Course: The patient is a 39 y.o. male who presents to [facility] with no place to go. Patient was just recently admitted to [facility] facility when he attempted to go smoke outside and was told he is not allowed to due to him being quarantined for 14 days because he was a new patient there. Patient then decided to leave [address] then when he got his motorized scooter down the street he decided to call 911 for assistance. Ambulance and police showed up in the place impounded his motorized scooter and the ambulance brought him to the hospital.  In the hospital his vitals and labs were checked which were all at their baseline. Patient had no acute reason to come into the hospital but needed placement. Patient normally is up near [place] but due to his behavioral issues and leaving [address] he was sent down to [facility].  It appears as if he is worn out his welcome at multiple sites up [place].   Patient will be placed in observation for the sole purpose of placement    Social work able to find placement for patient at [facility]. Was also able to get in touch with the patient's mother Judith Santiago who will start assisting. Patient is wheelchair still at the [address] [place] [address] lot where he needs to pick it up. It appears as if it would cost to $190 to  today, and the price goes up $30 every day. Patient stable for discharge. Exam:     /66   Pulse 59   Temp 98.1 °F (36.7 °C) (Oral)   Resp 16   Ht 6' (1.829 m)   Wt 245 lb 2.4 oz (111.2 kg)   SpO2 94%   BMI 33.25 kg/m²     General appearance: No apparent distress appears stated age and cooperative. HEENT Normal cephalic, atraumatic without obvious deformity. Pupils equal, round, and reactive to light. Extra ocular muscles intact. Conjunctivae/corneas clear. Neck: Supple, No jugular venous distention/bruits. Trachea midline without thyromegaly or adenopathy with full range of motion. Lungs: Clear to auscultation, bilaterally without Rales/Wheezes/Rhonchi  Heart: Regular rate and rhythm with Normal S1/S2   Abdomen: Soft, non-tender or non-distended without rigidity or guarding and positive bowel sounds all four quadrants. Extremities: No clubbing, cyanosis, or edema bilaterally. Skin: Skin color, texture, turgor normal.  No rashes or lesions. Neurologic: Alert and oriented X 3, paralysis of left upper extremity secondary to prior stroke, left lower extremity appears weaker compared to the right, face is symmetrical speech is clear, unable to grasp with his left hand and paralysis mental status: Alert, oriented, thought content appropriate.   Capillary Refill: Acceptable  < 3 seconds  Peripheral Pulses: +3 Easily felt, not easily obliterated with pressure      Consults:     IP CONSULT TO SOCIAL WORK  IP CONSULT TO PHARMACY  PHARMACY TO DOSE WARFARIN    Significant Diagnostic Studies:     XR CHEST PORTABLE   Final Result   Scar versus atelectasis, right lung base             Disposition: Long-term care    Condition at Discharge: Stable    Discharge Instructions/Follow-up: Primary care physician    Code Status:  Prior     Activity: activity as tolerated    Diet: regular diet      Labs: For convenience and continuity at follow-up the following most recent labs are provided:      CBC:    Lab Results   Component Value Date    WBC 15.8 09/25/2020    HGB 15.5 09/25/2020    HCT 46.3 09/25/2020     09/25/2020       Renal:    Lab Results   Component Value Date     09/25/2020    K 3.7 09/25/2020     09/25/2020    CO2 23 09/25/2020    BUN 11 09/25/2020    CREATININE 0.7 09/25/2020    CALCIUM 9.8 09/25/2020       Discharge Medications:     Discharge Medication List as of 9/25/2020  4:58 PM           Details   vitamin D (CHOLECALCIFEROL) 25 MCG (1000 UT) TABS tablet Take 2,000 Units by mouth dailyHistorical Med      polyethylene glycol (MIRALAX) 17 g PACK packet Take 17 g by mouth dailyHistorical Med      fluticasone (FLOVENT HFA) 110 MCG/ACT inhaler Inhale 2 puffs into the lungs 2 times dailyHistorical Med      olopatadine (PATANOL) 0.1 % ophthalmic solution Place 1 drop into both eyes 2 times dailyHistorical Med      baclofen (LIORESAL) 10 MG tablet Take 5 mg by mouth 3 times dailyHistorical Med      warfarin (COUMADIN) 6 MG tablet Take 6 mg by mouth nightlyHistorical Med      atorvastatin (LIPITOR) 40 MG tablet Take 40 mg by mouth nightlyHistorical Med      cetirizine (ZYRTEC) 10 MG tablet Take 10 mg by mouth nightlyHistorical Med      lamoTRIgine (LAMICTAL) 100 MG tablet Take 100 mg by mouth 3 times dailyHistorical Med      albuterol-ipratropium (COMBIVENT RESPIMAT)  MCG/ACT AERS inhaler Inhale 1 puff into the lungs every 4 hours as needed for Wheezing or Shortness of BreathHistorical Med      ondansetron (ZOFRAN) 4 MG tablet Take 4 mg by mouth every 8 hours as needed for Nausea or VomitingHistorical Med      clonazePAM (KLONOPIN) 0.5 MG tablet Take 0.5 mg by mouth 3 times daily as needed for Anxiety. Historical Med No future appointments. Time Spent on discharge is more than 30 minutes in the examination, evaluation, counseling and review of medications and discharge plan. Signed:    Roberto Carlos Raymond MD   9/26/2020      Thank you Julianne Chase MD for the opportunity to be involved in this patient's care. If you have any questions or concerns please feel free to contact me at [phone].